# Patient Record
Sex: FEMALE | Race: BLACK OR AFRICAN AMERICAN | NOT HISPANIC OR LATINO | URBAN - METROPOLITAN AREA
[De-identification: names, ages, dates, MRNs, and addresses within clinical notes are randomized per-mention and may not be internally consistent; named-entity substitution may affect disease eponyms.]

---

## 2021-12-20 ENCOUNTER — INPATIENT (INPATIENT)
Facility: HOSPITAL | Age: 36
LOS: 0 days | Discharge: HOME | DRG: 305 | End: 2021-12-21
Attending: INTERNAL MEDICINE | Admitting: INTERNAL MEDICINE
Payer: COMMERCIAL

## 2021-12-20 VITALS
WEIGHT: 220.02 LBS | OXYGEN SATURATION: 98 % | HEIGHT: 62 IN | SYSTOLIC BLOOD PRESSURE: 166 MMHG | RESPIRATION RATE: 18 BRPM | DIASTOLIC BLOOD PRESSURE: 126 MMHG | TEMPERATURE: 98 F | HEART RATE: 83 BPM

## 2021-12-20 DIAGNOSIS — D64.9 ANEMIA, UNSPECIFIED: ICD-10-CM

## 2021-12-20 DIAGNOSIS — I16.0 HYPERTENSIVE URGENCY: ICD-10-CM

## 2021-12-20 DIAGNOSIS — J45.909 UNSPECIFIED ASTHMA, UNCOMPLICATED: ICD-10-CM

## 2021-12-20 LAB
ALBUMIN SERPL ELPH-MCNC: 4.9 G/DL — SIGNIFICANT CHANGE UP (ref 3.3–5)
ALP SERPL-CCNC: 86 U/L — SIGNIFICANT CHANGE UP (ref 40–120)
ALT FLD-CCNC: 19 U/L — SIGNIFICANT CHANGE UP (ref 10–45)
ANION GAP SERPL CALC-SCNC: 13 MMOL/L — SIGNIFICANT CHANGE UP (ref 5–17)
AST SERPL-CCNC: 20 U/L — SIGNIFICANT CHANGE UP (ref 10–40)
BILIRUB SERPL-MCNC: 0.3 MG/DL — SIGNIFICANT CHANGE UP (ref 0.2–1.2)
BUN SERPL-MCNC: 7 MG/DL — SIGNIFICANT CHANGE UP (ref 7–23)
CALCIUM SERPL-MCNC: 9.8 MG/DL — SIGNIFICANT CHANGE UP (ref 8.4–10.5)
CHLORIDE SERPL-SCNC: 102 MMOL/L — SIGNIFICANT CHANGE UP (ref 96–108)
CK MB CFR SERPL CALC: 1.8 NG/ML — SIGNIFICANT CHANGE UP (ref 0–6.7)
CK SERPL-CCNC: 149 U/L — SIGNIFICANT CHANGE UP (ref 25–170)
CO2 SERPL-SCNC: 23 MMOL/L — SIGNIFICANT CHANGE UP (ref 22–31)
CREAT SERPL-MCNC: 0.67 MG/DL — SIGNIFICANT CHANGE UP (ref 0.5–1.3)
D DIMER BLD IA.RAPID-MCNC: <150 NG/ML DDU — SIGNIFICANT CHANGE UP
GLUCOSE SERPL-MCNC: 84 MG/DL — SIGNIFICANT CHANGE UP (ref 70–99)
HCT VFR BLD CALC: 32.4 % — LOW (ref 34.5–45)
HGB BLD-MCNC: 8.8 G/DL — LOW (ref 11.5–15.5)
MCHC RBC-ENTMCNC: 19 PG — LOW (ref 27–34)
MCHC RBC-ENTMCNC: 27.2 GM/DL — LOW (ref 32–36)
MCV RBC AUTO: 70.1 FL — LOW (ref 80–100)
NRBC # BLD: 0 /100 WBCS — SIGNIFICANT CHANGE UP (ref 0–0)
PLATELET # BLD AUTO: 436 K/UL — HIGH (ref 150–400)
POTASSIUM SERPL-MCNC: 4.1 MMOL/L — SIGNIFICANT CHANGE UP (ref 3.5–5.3)
POTASSIUM SERPL-SCNC: 4.1 MMOL/L — SIGNIFICANT CHANGE UP (ref 3.5–5.3)
PROT SERPL-MCNC: 8.4 G/DL — HIGH (ref 6–8.3)
RBC # BLD: 4.62 M/UL — SIGNIFICANT CHANGE UP (ref 3.8–5.2)
RBC # FLD: SIGNIFICANT CHANGE UP (ref 10.3–14.5)
SARS-COV-2 RNA SPEC QL NAA+PROBE: NEGATIVE — SIGNIFICANT CHANGE UP
SODIUM SERPL-SCNC: 138 MMOL/L — SIGNIFICANT CHANGE UP (ref 135–145)
TROPONIN T SERPL-MCNC: 0.01 NG/ML — SIGNIFICANT CHANGE UP (ref 0–0.01)
TROPONIN T SERPL-MCNC: 0.01 NG/ML — SIGNIFICANT CHANGE UP (ref 0–0.01)
WBC # BLD: 6.82 K/UL — SIGNIFICANT CHANGE UP (ref 3.8–10.5)
WBC # FLD AUTO: 6.82 K/UL — SIGNIFICANT CHANGE UP (ref 3.8–10.5)

## 2021-12-20 PROCEDURE — 71046 X-RAY EXAM CHEST 2 VIEWS: CPT | Mod: 26

## 2021-12-20 PROCEDURE — 99284 EMERGENCY DEPT VISIT MOD MDM: CPT

## 2021-12-20 RX ORDER — ALBUTEROL 90 UG/1
2 AEROSOL, METERED ORAL EVERY 6 HOURS
Refills: 0 | Status: DISCONTINUED | OUTPATIENT
Start: 2021-12-20 | End: 2021-12-21

## 2021-12-20 RX ORDER — FERROUS SULFATE 325(65) MG
1 TABLET ORAL
Qty: 0 | Refills: 0 | DISCHARGE

## 2021-12-20 RX ORDER — LABETALOL HCL 100 MG
200 TABLET ORAL EVERY 12 HOURS
Refills: 0 | Status: DISCONTINUED | OUTPATIENT
Start: 2021-12-21 | End: 2021-12-21

## 2021-12-20 RX ORDER — ERGOCALCIFEROL 1.25 MG/1
1 CAPSULE ORAL
Qty: 0 | Refills: 0 | DISCHARGE

## 2021-12-20 RX ORDER — INFLUENZA VIRUS VACCINE 15; 15; 15; 15 UG/.5ML; UG/.5ML; UG/.5ML; UG/.5ML
0.5 SUSPENSION INTRAMUSCULAR ONCE
Refills: 0 | Status: DISCONTINUED | OUTPATIENT
Start: 2021-12-20 | End: 2021-12-21

## 2021-12-20 RX ORDER — LABETALOL HCL 100 MG
20 TABLET ORAL ONCE
Refills: 0 | Status: COMPLETED | OUTPATIENT
Start: 2021-12-20 | End: 2021-12-20

## 2021-12-20 RX ORDER — FERROUS SULFATE 325(65) MG
325 TABLET ORAL DAILY
Refills: 0 | Status: DISCONTINUED | OUTPATIENT
Start: 2021-12-20 | End: 2021-12-21

## 2021-12-20 RX ORDER — LABETALOL HCL 100 MG
10 TABLET ORAL ONCE
Refills: 0 | Status: COMPLETED | OUTPATIENT
Start: 2021-12-20 | End: 2021-12-20

## 2021-12-20 RX ORDER — ENOXAPARIN SODIUM 100 MG/ML
40 INJECTION SUBCUTANEOUS EVERY 12 HOURS
Refills: 0 | Status: DISCONTINUED | OUTPATIENT
Start: 2021-12-20 | End: 2021-12-21

## 2021-12-20 RX ORDER — LABETALOL HCL 100 MG
200 TABLET ORAL ONCE
Refills: 0 | Status: COMPLETED | OUTPATIENT
Start: 2021-12-20 | End: 2021-12-20

## 2021-12-20 RX ORDER — ALBUTEROL 90 UG/1
2 AEROSOL, METERED ORAL
Qty: 0 | Refills: 0 | DISCHARGE

## 2021-12-20 RX ADMIN — Medication 20 MILLIGRAM(S): at 14:16

## 2021-12-20 RX ADMIN — Medication 200 MILLIGRAM(S): at 23:14

## 2021-12-20 RX ADMIN — Medication 10 MILLIGRAM(S): at 17:46

## 2021-12-20 RX ADMIN — ENOXAPARIN SODIUM 40 MILLIGRAM(S): 100 INJECTION SUBCUTANEOUS at 22:12

## 2021-12-20 RX ADMIN — Medication 325 MILLIGRAM(S): at 22:12

## 2021-12-20 NOTE — ED PROVIDER NOTE - EKG ADDITIONAL INFORMATION FREE TEXT
EKG Reviewed  SR 82 no STEMI. No NILTON or STD. Nonspecific T wave flattening.   borderline Prolonged QTc 462

## 2021-12-20 NOTE — ED PROVIDER NOTE - OBJECTIVE STATEMENT
36 year old female with history of HTN and Iron deficiency anemia presenting from Cardiologist office for Hypertension. Patient was at her Cardiologist Dr. Cooper for a routine Stress test and was found to have Hypertension > 200 systolic and was sent to ED for further evaluation. patient states she has had intermittent right sided chest pinching pain for past year. Pain is intermittent, not worsened with exertion, described as pinching and pressure like. No associated shortness of breath. No nausea/vomiting, no radiation of pain. No hx of cardiac disease, no fam history of early cardiac death.  Had echo 11/29 she does not know results.   Takes metoprolol for BP. States does not tolerate calcium channel blockers as she has had headaches. Used to take labetalol but was taken off it for unknown reason per patient, states she tolerated labetalol well.   LMP 12/1, has heavy periods.

## 2021-12-20 NOTE — H&P ADULT - NSICDXPASTMEDICALHX_GEN_ALL_CORE_FT
PAST MEDICAL HISTORY:  H/O iron deficiency anemia     Hypertension      PAST MEDICAL HISTORY:  H/O iron deficiency anemia     Hypertension     Miscarriage 7/2021    Uterine fibroid

## 2021-12-20 NOTE — H&P ADULT - PROBLEM SELECTOR PLAN 1
Pt presented to Dr. Cooper's office for routine stress test for evaluation of intermittent chest pressure and L arm tingling x 1 year. SBP >200 and sent to ED. In ED BP as high as 229/145. Pt currently endorsing mild L sided chest pressure.   - Trop neg x 1, f/u next @ 8PM  - ECG: NSR @ 82bpm, upsloping ST in V2  - CXR nl   - S/p Labetalol 10mg IV x 3, BP currently 170/125  - Pt previously on Labetalol 200mg PO BID which was switched to Toprol XL 25mg PO QD with Olmesartan 20mg PO QD after she had an ECHO 11/2021 during which she had abnormal heart beats and was sent a 4 day holter monitor revealing abnormal heart beats as well.   - Pt had severe HA with Nifedipine, Felodipine and Amlodipine. Metoprolol causes exacerbation of her asthma (wheezing)  - Restart Labetalol 200mg PO BID in AM   - Repeat ECHO ordered   - Tele monitoring Acitretin Pregnancy And Lactation Text: This medication is Pregnancy Category X and should not be given to women who are pregnant or may become pregnant in the future. This medication is excreted in breast milk.

## 2021-12-20 NOTE — ED ADULT NURSE NOTE - CHIEF COMPLAINT QUOTE
37 [facility] of [facility], Pt was supposed to have stress test, however BP was elevated at the office. . Pt did not get her Metoprolol today.

## 2021-12-20 NOTE — PATIENT PROFILE ADULT - FALL HARM RISK - HARM RISK INTERVENTIONS

## 2021-12-20 NOTE — ED ADULT NURSE NOTE - OBJECTIVE STATEMENT
pt. a&ox4 ambulatory snet to ED by cardiologist after high bp readings of sbp >200. Pt. was scheduled for a stress test today. pt. has hx of htn, placed on 4 different medications for BP in the last month by cardiologist. Pt. is currently on metoprolol and another bp med pt. does not recall the name of. Pt. reports an intermittent squeezing of the right side of her chest since yesterday, with occasional tingling down the left arm. Pt. denies cp currently, denies radiation of the squeezing sensation, denies headache, changes in vision, dizziness, n/v/d, fever, chills, leg swelling. Pt. reports she has been getting more SOB upon exertion. Pt. also dxed with anemia a few months ago and has been taking PO iron supplements for it and was referred to a hematologist. Pt. had miscarriage in July, and since then pt. reports her bp has been high despite medication compliance. Pt. airway patent, speaking in clear coherent sentences, breathing spontaneous and unlabored.

## 2021-12-20 NOTE — ED ADULT NURSE NOTE - NSIMPLEMENTINTERV_GEN_ALL_ED
Implemented All Universal Safety Interventions:  [place] to call system. Call bell, personal items and telephone within reach. Instruct patient to call for assistance. Room bathroom lighting operational. Non-slip footwear when patient is off stretcher. Physically safe environment: no spills, clutter or unnecessary equipment. Stretcher in lowest position, wheels locked, appropriate side rails in place.

## 2021-12-20 NOTE — ED PROVIDER NOTE - CROS ED GI ALL NEG
[address] [address]  Phone - [phone]      Chief Complaint:     Mirella Flores is a 28 year old female who is here for:    1. Drug screening, pre-employment      Needs urine drug screen  States she took home drug test and was positive for marijuana  Had urine drug scree here about 1 month ago - Negative  Does not do any marijuana  Around people who do it    History:     Past Medical History:   Diagnosis Date   • Anemia    • Breast disorder     fibrocystic changes v fibroadenoma   • Bronchitis    • Depression 2009   • Depressive disorder, not elsewhere classified 2/15/2012   • H. pylori infection 02/18/2015, 7/2019   • NO HX OF     HTN, DM, CVA, DVT/PE, MI, Cancer   • Personal history of traumatic fracture     wrist   • Urinary tract infection 01/2019       Social History     Socioeconomic History   • Marital status: Single     Spouse name: Blake   • Number of children: Not on file   • Years of education: Not on file   • Highest education level: Not on file   Occupational History     Comment: MA training   Social Needs   • Financial resource strain: Not hard at all   • Food insecurity:     Worry: Never true     Inability: Never true   • Transportation needs:     Medical: No     Non-medical: No   Tobacco Use   • Smoking status: Never Smoker   • Smokeless tobacco: Never Used   Substance and Sexual Activity   • Alcohol use: No     Frequency: Never     Drinks per session: 1 or 2     Binge frequency: Never   • Drug use: No   • Sexual activity: Yes     Partners: Male     Birth control/protection: None     Comment: Monogamous x11 years - FOB   Lifestyle   • Physical activity:     Days per week: Not on file     Minutes per session: Not on file   • Stress: Not on file   Relationships   • Social connections:     Talks on phone: Not on file     Gets together: Not on file     Attends Ca[employer]ism service: Not on file     Active member of club or organization:  Not on file     Attends meetings of clubs or organizations: Not on file     Relationship status: Not on file   • Intimate partner violence:     Fear of current or ex partner: No     Emotionally abused: No     Physically abused: No     Forced sexual activity: No   Other Topics Concern   • Not on file   Social History Narrative    Lives at home with SO and 3yr old. Feels safe in environment       Family History   Problem Relation Age of Onset   • Hypertension Mother    • Cancer, Breast Mother 39   • Asthma Father    • Diabetes Paternal Grandmother    • Cancer Maternal Uncle 40        lung cancer, is a smoker   • Other Neg Hx         DVT/PE, Bleeding disorder, CVA, Early MI, Other cancer         Review of Systems:     Negative except as noted above.     Physical Examination:     Vital Signs:   Visit Vitals  BP 98/60   Pulse 76   Wt 55.6 kg   LMP 05/01/2019   BMI 21.71 kg/m²     General: Well-developed, well-nourished female , in no acute distress. Pt is alert and oriented x 3.     Assessment and Plan:   1. Drug screening, pre-employment  - DRUG SCREEN COMPLETE, URINE; Future  - can print the result from [employer] chart or can  the copy of result      Follow up as needed     Instructions provided as documented in the after visit summary.    The patient indicated understanding of the diagnosis and agreed with the plan of care.    Medication side effects, risk and benefits discussed. Patient is aware of that and is willing to take the medication.    Patient instructions. Advised the patient if symptoms get worse, develops any new symptoms, concerns, questions, problems, to give me a call or come in. If it is after hours the patient should call the on-call physician or go to the emergency room. The patient knows when to call us and when to go to the ER. Follow up with me after above testing. All plans discussed with the patient at length. The patient understands and agrees.    Any time labs are done,  please remember  to check your lab results via [employer] 48 hrs after you get labs done or  to call our office if you have not gotten notice of your lab results within 2 weeks, thanks.    Continue medicines per Epic as applicable.    Please see After Visit Summary for other details of the remaining discussion.     Hector Salguero MD  Family Practice       negative...

## 2021-12-20 NOTE — H&P ADULT - ASSESSMENT
35 yo F with PMHx of HTN, Uterine fibroids, JOSE, Asthma (no recent hospitalizations, no intubations) who presented to the [facility] ED sent by her cardiologist Dr. Cooper for evaluation of elevated blood pressure. Pt initially presented to his office for symptoms of intermittent chest pressure and was scheduled for a routine stress test. Her BP was found to be >200 and she was sent to the ED. BP was as high as 229/145 which improved with IV Labetalol Pt is now admitted to cardiology/telemetry for further management of hypertensive urgency.

## 2021-12-20 NOTE — H&P ADULT - NSHPLABSRESULTS_GEN_ALL_CORE
8.8    6.82  )-----------( 436      ( 20 Dec 2021 15:57 )             32.4       12-20    138  |  102  |  7   ----------------------------<  84  4.1   |  23  |  0.67    Ca    9.8      20 Dec 2021 14:25    TPro  8.4<H>  /  Alb  4.9  /  TBili  0.3  /  DBili  x   /  AST  20  /  ALT  19  /  AlkPhos  86  12-20          CARDIAC MARKERS ( 20 Dec 2021 14:25 )  x     / 0.01 ng/mL / x     / x     / x

## 2021-12-20 NOTE — ED PROVIDER NOTE - PHYSICAL EXAMINATION
CONSTITUTIONAL: Well-developed; well-nourished; in no acute distress.   HEAD:  normocephalic, atraumatic.  EYES: EOM intact; PERRL. conjunctiva and sclera clear.  ENT: Airway clear. Moist mucus membranes  NECK: Supple; non tender. No JVD.   CARD: S1, S2 normal; no murmurs, gallops, or rubs. Regular rate and rhythm. No chest wall tenderness.    RESP:  Clear to auscultation b/l, no wheezes, rales or rhonchi.  ABD: Normal bowel sounds; soft; non-distended; non-tender; no guarding/ rebound/rigidity.   EXT: Normal ROM. No clubbing, cyanosis or edema. 2+ pulses to b/l ue/le. Equal pulses all 4 extremities.   NEURO: Alert, oriented, no focal neuro deficits   SKIN:  warm and dry, no acute rash.

## 2021-12-20 NOTE — ED PROVIDER NOTE - PROGRESS NOTE DETAILS
SPoke to Dr. Cooper Cardiology who requests admission for hypertensive urgency.   States he has outpatient Loop recorder with episodes of NSVT which is why he has her on Metoprolol. Spoke to Dr. Cooper Cardiology who requests admission for hypertensive urgency.   States he has outpatient Loop recorder with episodes of NSVT which is why he has her on Metoprolol.

## 2021-12-20 NOTE — H&P ADULT - PROBLEM SELECTOR PLAN 3
H/o mild asthma, not currently in exacerbation  - On Albuterol at home, ordered Duonebs PRN          DVT PPX: Lovenox SQ  Dispo: Pending clinical progression

## 2021-12-20 NOTE — H&P ADULT - PROBLEM SELECTOR PLAN 2
Hgb/HCT 8.8/32.4. Per pt Hgb was 7.8 3 weeks ago.   - H/o uterine fibroids and a miscarriage in 7/2021, since then she has had worsening of menstrual bleeding. Follows with GYN  - On Iron at home, continue  - F/u iron studies  - Maintain active T&S

## 2021-12-20 NOTE — ED PROVIDER NOTE - CLINICAL SUMMARY MEDICAL DECISION MAKING FREE TEXT BOX
Patient with hypertensive urgency plan to decrease SBP by 20%.   Lab work, EKG, cardiac monitor.   Will discuss case with patient's Cardiologist Dr. Cooper.

## 2021-12-20 NOTE — ED ADULT TRIAGE NOTE - CHIEF COMPLAINT QUOTE
37 [facility] of [facility], Pt was supposed to have stress test, however BP was elevated at the office. . Pt did not get her Metoprolol today.

## 2021-12-20 NOTE — H&P ADULT - HISTORY OF PRESENT ILLNESS
INCOMPLETE    37 yo F with PMHx of HTN and JOSE who presented to the [facility] ED sent by her cardiologist Dr. Cooper for evaluation of elevated blood pressure. Pt initially presented to his office for a stress test and her BP was found to be >200 and she was sent to the ED. Pt reports she has has intermittent R sided pinching CP at rest, not associated with exertion x 1 year. Pt had an ECHO done on 11/19/21, does not recall the results. Pt denies fever, chills, cough, palpitations, SOB, PND/orthopnea, LE edema, abdominal pain, N/V/D, dizziness, syncope.   In the ED VS: F 98.1F, /126 (has high as 229/145), HR 83bpm, RR 18, O2 sat 100% on RA. Labs significant for Hgb/HCT 8.8/32.4, Trop neg, COVID PCR neg. ECG: NSR @ 82bpm, upsloping ST in V2. CXR: no acute abnormalities. Pt was given Labetalol 20mg IV x 1 with improvement in BP to 145/85 which increased back up to 195/109 and an additional Labetalol 10mg IV x 1 given. Pt is now admitted to cardiology/telemetry for further management of hypertensive urgency.  35 yo F with PMHx of HTN, Uterine fibroids, JOSE, Asthma (no recent hospitalizations, no intubations) who presented to the [facility] ED sent by her cardiologist Dr. Cooper for evaluation of elevated blood pressure. Pt initially presented to his office for a routine stress test and her BP was found to be >200 and she was sent to the ED. Pt reports she has had intermittent R sided pinching CP at rest with L arm tingling, not associated with exertion x 1 year. Pt had an ECHO done on 11/19/21, states that there was remodeling as well as abnormal heart beats noted during the study so she was sent a 4 day holtor monitor which also revealed abnormal heart beats. Her home Labetalol was then d/c and she was started on Toprol XL and Olmesartan. She is concerned that the Metoprolol is exacerbating her asthma and causing her to wheeze. Pt denies fever, chills, cough, palpitations, SOB, PND/orthopnea, LE edema, abdominal pain, N/V/D, dizziness, syncope, HA, blurred vision. In the ED VS: F 98.1F, /126 (has high as 229/145), HR 83bpm, RR 18, O2 sat 100% on RA. Labs significant for Hgb/HCT 8.8/32.4, Trop neg, COVID PCR neg. ECG: NSR @ 82bpm, upsloping ST in V2. CXR: no acute abnormalities. Pt was given Labetalol 10mg IV x 2 with improvement in BP to 145/85 which increased back up to 195/109 and an additional Labetalol 10mg IV x 1 given. Pt is now admitted to cardiology/telemetry for further management of hypertensive urgency.

## 2021-12-21 VITALS — TEMPERATURE: 98 F

## 2021-12-21 LAB
A1C WITH ESTIMATED AVERAGE GLUCOSE RESULT: 4.6 % — SIGNIFICANT CHANGE UP (ref 4–5.6)
ANION GAP SERPL CALC-SCNC: 15 MMOL/L — SIGNIFICANT CHANGE UP (ref 5–17)
ANISOCYTOSIS BLD QL: SIGNIFICANT CHANGE UP
BASOPHILS # BLD AUTO: 0.11 K/UL — SIGNIFICANT CHANGE UP (ref 0–0.2)
BASOPHILS NFR BLD AUTO: 1.7 % — SIGNIFICANT CHANGE UP (ref 0–2)
BLD GP AB SCN SERPL QL: NEGATIVE — SIGNIFICANT CHANGE UP
BUN SERPL-MCNC: 9 MG/DL — SIGNIFICANT CHANGE UP (ref 7–23)
CALCIUM SERPL-MCNC: 9.2 MG/DL — SIGNIFICANT CHANGE UP (ref 8.4–10.5)
CHLORIDE SERPL-SCNC: 102 MMOL/L — SIGNIFICANT CHANGE UP (ref 96–108)
CHOLEST SERPL-MCNC: 227 MG/DL — HIGH
CO2 SERPL-SCNC: 21 MMOL/L — LOW (ref 22–31)
CREAT SERPL-MCNC: 0.77 MG/DL — SIGNIFICANT CHANGE UP (ref 0.5–1.3)
DACRYOCYTES BLD QL SMEAR: SLIGHT — SIGNIFICANT CHANGE UP
ELLIPTOCYTES BLD QL SMEAR: SLIGHT — SIGNIFICANT CHANGE UP
EOSINOPHIL # BLD AUTO: 0.17 K/UL — SIGNIFICANT CHANGE UP (ref 0–0.5)
EOSINOPHIL NFR BLD AUTO: 2.6 % — SIGNIFICANT CHANGE UP (ref 0–6)
ESTIMATED AVERAGE GLUCOSE: 85 MG/DL — SIGNIFICANT CHANGE UP (ref 68–114)
FERRITIN SERPL-MCNC: 46 NG/ML — SIGNIFICANT CHANGE UP (ref 15–150)
FOLATE SERPL-MCNC: 8.8 NG/ML — SIGNIFICANT CHANGE UP
GIANT PLATELETS BLD QL SMEAR: PRESENT — SIGNIFICANT CHANGE UP
GLUCOSE SERPL-MCNC: 78 MG/DL — SIGNIFICANT CHANGE UP (ref 70–99)
HCG UR QL: NEGATIVE — SIGNIFICANT CHANGE UP
HCT VFR BLD CALC: 31.8 % — LOW (ref 34.5–45)
HDLC SERPL-MCNC: 65 MG/DL — SIGNIFICANT CHANGE UP
HGB BLD-MCNC: 8.4 G/DL — LOW (ref 11.5–15.5)
HYPOCHROMIA BLD QL: SLIGHT — SIGNIFICANT CHANGE UP
IRON SATN MFR SERPL: 121 UG/DL — SIGNIFICANT CHANGE UP (ref 30–160)
IRON SATN MFR SERPL: 38 % — SIGNIFICANT CHANGE UP (ref 14–50)
LIPID PNL WITH DIRECT LDL SERPL: 147 MG/DL — HIGH
LYMPHOCYTES # BLD AUTO: 3.12 K/UL — SIGNIFICANT CHANGE UP (ref 1–3.3)
LYMPHOCYTES # BLD AUTO: 47.4 % — HIGH (ref 13–44)
MAGNESIUM SERPL-MCNC: 2.1 MG/DL — SIGNIFICANT CHANGE UP (ref 1.6–2.6)
MANUAL SMEAR VERIFICATION: SIGNIFICANT CHANGE UP
MCHC RBC-ENTMCNC: 19.1 PG — LOW (ref 27–34)
MCHC RBC-ENTMCNC: 26.4 GM/DL — LOW (ref 32–36)
MCV RBC AUTO: 72.3 FL — LOW (ref 80–100)
MICROCYTES BLD QL: SIGNIFICANT CHANGE UP
MONOCYTES # BLD AUTO: 0.75 K/UL — SIGNIFICANT CHANGE UP (ref 0–0.9)
MONOCYTES NFR BLD AUTO: 11.4 % — SIGNIFICANT CHANGE UP (ref 2–14)
NEUTROPHILS # BLD AUTO: 2.37 K/UL — SIGNIFICANT CHANGE UP (ref 1.8–7.4)
NEUTROPHILS NFR BLD AUTO: 36 % — LOW (ref 43–77)
NON HDL CHOLESTEROL: 162 MG/DL — HIGH
OVALOCYTES BLD QL SMEAR: SLIGHT — SIGNIFICANT CHANGE UP
PLAT MORPH BLD: ABNORMAL
PLATELET # BLD AUTO: 444 K/UL — HIGH (ref 150–400)
POIKILOCYTOSIS BLD QL AUTO: SLIGHT — SIGNIFICANT CHANGE UP
POLYCHROMASIA BLD QL SMEAR: SLIGHT — SIGNIFICANT CHANGE UP
POTASSIUM SERPL-MCNC: 3.8 MMOL/L — SIGNIFICANT CHANGE UP (ref 3.5–5.3)
POTASSIUM SERPL-SCNC: 3.8 MMOL/L — SIGNIFICANT CHANGE UP (ref 3.5–5.3)
RBC # BLD: 4.41 M/UL — SIGNIFICANT CHANGE UP (ref 3.8–5.2)
RBC # BLD: 4.41 M/UL — SIGNIFICANT CHANGE UP (ref 3.8–5.2)
RBC # FLD: 33.7 % — HIGH (ref 10.3–14.5)
RBC BLD AUTO: ABNORMAL
RETICS #: 71.9 K/UL — SIGNIFICANT CHANGE UP (ref 25–125)
RETICS/RBC NFR: 1.6 % — SIGNIFICANT CHANGE UP (ref 0.5–2.5)
RH IG SCN BLD-IMP: NEGATIVE — SIGNIFICANT CHANGE UP
SCHISTOCYTES BLD QL AUTO: SLIGHT — SIGNIFICANT CHANGE UP
SODIUM SERPL-SCNC: 138 MMOL/L — SIGNIFICANT CHANGE UP (ref 135–145)
TARGETS BLD QL SMEAR: SLIGHT — SIGNIFICANT CHANGE UP
TIBC SERPL-MCNC: 319 UG/DL — SIGNIFICANT CHANGE UP (ref 220–430)
TRANSFERRIN SERPL-MCNC: 283 MG/DL — SIGNIFICANT CHANGE UP (ref 200–360)
TRIGL SERPL-MCNC: 73 MG/DL — SIGNIFICANT CHANGE UP
TSH SERPL-MCNC: 1.73 UIU/ML — SIGNIFICANT CHANGE UP (ref 0.27–4.2)
UIBC SERPL-MCNC: 198 UG/DL — SIGNIFICANT CHANGE UP (ref 110–370)
VARIANT LYMPHS # BLD: 0.9 % — SIGNIFICANT CHANGE UP (ref 0–6)
VIT B12 SERPL-MCNC: 560 PG/ML — SIGNIFICANT CHANGE UP (ref 232–1245)
WBC # BLD: 6.59 K/UL — SIGNIFICANT CHANGE UP (ref 3.8–10.5)
WBC # FLD AUTO: 6.59 K/UL — SIGNIFICANT CHANGE UP (ref 3.8–10.5)

## 2021-12-21 PROCEDURE — 85379 FIBRIN DEGRADATION QUANT: CPT

## 2021-12-21 PROCEDURE — 81025 URINE PREGNANCY TEST: CPT

## 2021-12-21 PROCEDURE — 82746 ASSAY OF FOLIC ACID SERUM: CPT

## 2021-12-21 PROCEDURE — 87635 SARS-COV-2 COVID-19 AMP PRB: CPT

## 2021-12-21 PROCEDURE — 96375 TX/PRO/DX INJ NEW DRUG ADDON: CPT

## 2021-12-21 PROCEDURE — 71046 X-RAY EXAM CHEST 2 VIEWS: CPT

## 2021-12-21 PROCEDURE — 75574 CT ANGIO HRT W/3D IMAGE: CPT

## 2021-12-21 PROCEDURE — 85027 COMPLETE CBC AUTOMATED: CPT

## 2021-12-21 PROCEDURE — 36415 COLL VENOUS BLD VENIPUNCTURE: CPT

## 2021-12-21 PROCEDURE — 96374 THER/PROPH/DIAG INJ IV PUSH: CPT

## 2021-12-21 PROCEDURE — 83735 ASSAY OF MAGNESIUM: CPT

## 2021-12-21 PROCEDURE — 93005 ELECTROCARDIOGRAM TRACING: CPT

## 2021-12-21 PROCEDURE — 82607 VITAMIN B-12: CPT

## 2021-12-21 PROCEDURE — 80061 LIPID PANEL: CPT

## 2021-12-21 PROCEDURE — 83540 ASSAY OF IRON: CPT

## 2021-12-21 PROCEDURE — 93306 TTE W/DOPPLER COMPLETE: CPT

## 2021-12-21 PROCEDURE — 86901 BLOOD TYPING SEROLOGIC RH(D): CPT

## 2021-12-21 PROCEDURE — 83550 IRON BINDING TEST: CPT

## 2021-12-21 PROCEDURE — 86900 BLOOD TYPING SEROLOGIC ABO: CPT

## 2021-12-21 PROCEDURE — 99285 EMERGENCY DEPT VISIT HI MDM: CPT | Mod: 25

## 2021-12-21 PROCEDURE — 85025 COMPLETE CBC W/AUTO DIFF WBC: CPT

## 2021-12-21 PROCEDURE — 82728 ASSAY OF FERRITIN: CPT

## 2021-12-21 PROCEDURE — 85045 AUTOMATED RETICULOCYTE COUNT: CPT

## 2021-12-21 PROCEDURE — 84443 ASSAY THYROID STIM HORMONE: CPT

## 2021-12-21 PROCEDURE — 80053 COMPREHEN METABOLIC PANEL: CPT

## 2021-12-21 PROCEDURE — 82550 ASSAY OF CK (CPK): CPT

## 2021-12-21 PROCEDURE — 86850 RBC ANTIBODY SCREEN: CPT

## 2021-12-21 PROCEDURE — 84466 ASSAY OF TRANSFERRIN: CPT

## 2021-12-21 PROCEDURE — 75574 CT ANGIO HRT W/3D IMAGE: CPT | Mod: 26

## 2021-12-21 PROCEDURE — 82553 CREATINE MB FRACTION: CPT

## 2021-12-21 PROCEDURE — 84484 ASSAY OF TROPONIN QUANT: CPT

## 2021-12-21 PROCEDURE — 83036 HEMOGLOBIN GLYCOSYLATED A1C: CPT

## 2021-12-21 PROCEDURE — 93306 TTE W/DOPPLER COMPLETE: CPT | Mod: 26

## 2021-12-21 PROCEDURE — 80048 BASIC METABOLIC PNL TOTAL CA: CPT

## 2021-12-21 RX ORDER — METOPROLOL TARTRATE 50 MG
1 TABLET ORAL
Qty: 0 | Refills: 0 | DISCHARGE

## 2021-12-21 RX ORDER — METOPROLOL TARTRATE 50 MG
50 TABLET ORAL ONCE
Refills: 0 | Status: COMPLETED | OUTPATIENT
Start: 2021-12-21 | End: 2021-12-21

## 2021-12-21 RX ORDER — ACETAMINOPHEN 500 MG
650 TABLET ORAL EVERY 6 HOURS
Refills: 0 | Status: DISCONTINUED | OUTPATIENT
Start: 2021-12-21 | End: 2021-12-21

## 2021-12-21 RX ORDER — LABETALOL HCL 100 MG
300 TABLET ORAL
Refills: 0 | Status: DISCONTINUED | OUTPATIENT
Start: 2021-12-21 | End: 2021-12-21

## 2021-12-21 RX ORDER — LABETALOL HCL 100 MG
1 TABLET ORAL
Qty: 60 | Refills: 0
Start: 2021-12-21 | End: 2022-01-19

## 2021-12-21 RX ORDER — OLMESARTAN MEDOXOMIL 5 MG/1
1 TABLET, FILM COATED ORAL
Qty: 0 | Refills: 0 | DISCHARGE

## 2021-12-21 RX ADMIN — Medication 200 MILLIGRAM(S): at 09:02

## 2021-12-21 RX ADMIN — Medication 650 MILLIGRAM(S): at 06:40

## 2021-12-21 RX ADMIN — ENOXAPARIN SODIUM 40 MILLIGRAM(S): 100 INJECTION SUBCUTANEOUS at 09:02

## 2021-12-21 RX ADMIN — Medication 650 MILLIGRAM(S): at 07:15

## 2021-12-21 RX ADMIN — Medication 50 MILLIGRAM(S): at 12:53

## 2021-12-21 RX ADMIN — Medication 325 MILLIGRAM(S): at 09:02

## 2021-12-21 NOTE — DISCHARGE NOTE PROVIDER - PROVIDER TOKENS
FREE:[LAST:[Allison],FIRST:[Cadence],PHONE:[phone]],FAX:[(   )    -],ADDRESS:[[address] [place] [address]],FOLLOWUP:[2 weeks],ESTABLISHEDPATIENT:[T]]

## 2021-12-21 NOTE — DISCHARGE NOTE PROVIDER - HOSPITAL COURSE
35 yo F with PMHx of HTN, Uterine fibroids, JOSE, Asthma (no recent hospitalizations, no intubations) who presented to the [facility] ED sent by her cardiologist Dr. Cadence Cooper for evaluation of elevated blood pressure. Pt initially presented to his office for a routine stress test and her BP was found to be >200 and she was sent to the ED. Pt reports she has had intermittent L sided pinching CP at rest with L arm tingling, not associated with exertion x 1 year. Pt had an ECHO done on 11/19/21, states that there was remodeling as well as abnormal heart beats noted during the study so she was sent a 4 day holter monitor which also revealed abnormal heart beats. Her home Labetalol was then d/c and she was started on Toprol XL and Olmesartan. She reports that Metoprolol is exacerbating her asthma and is causing her to wheeze at night. In the ED: /126 (has high as 229/145), HR 83bpm, RR 18, O2 sat 100% on RA. Labs significant for Hgb/HCT 8.8/32.4, Trop neg x 2, COVID PCR neg. ECG: NSR @ 82bpm, upsloping ST in V2. CXR: no acute abnormalities. Pt was given Labetalol 10mg IV x 2 with improvement in BP to 145/85 which increased back up to 195/109 and an additional Labetalol 10mg IV x 1 given. Pt was admitted to cardiac telemetry for hypertensive urgency.  Pt was resumed on previous dose of Labetalol 200mg BID w/ /90s. Labetalol was further uptitrated to 300mg BID at discharge for BP control. ECHO unremarkable, revealed EF 60-65%, normal LV/RV size and systolic function, no significant valvular disease. CCTA revealed calcium score zero.  Bridge of mid to distal LAD with minimal systolic narrowing.  Probably normal RPDA, Remaining coronary segments appear normal, Left ventricular concentric hypertrophy noted.     On the day of discharge, the patient was seen and examined. Symptoms improved. Vital signs are stable. Labs and imaging reviewed. Patient is medically optimized and hemodynamically stable. Return precautions discussed, medication teach back done, and importance of physician followup emphasized. The patient verbalized understanding. Attempted to call outpatient cardiologist Dr Cooper for collateral and updates, left message to call back as was unable to get thru office.

## 2021-12-21 NOTE — DIETITIAN INITIAL EVALUATION ADULT. - ENTER FROM (CAL/KG)
[No Acute Distress] : no acute distress [Normal Oropharynx] : normal oropharynx [Normal Appearance] : normal appearance [No Neck Mass] : no neck mass [Normal Rate/Rhythm] : normal rate/rhythm [Normal S1, S2] : normal s1, s2 [No Murmurs] : no murmurs [No Resp Distress] : no resp distress [Clear to Auscultation Bilaterally] : clear to auscultation bilaterally [No Abnormalities] : no abnormalities [Benign] : benign [Normal Gait] : normal gait [No Clubbing] : no clubbing [No Cyanosis] : no cyanosis [No Edema] : no edema [FROM] : FROM [Normal Color/ Pigmentation] : normal color/ pigmentation [No Focal Deficits] : no focal deficits [Oriented x3] : oriented x3 [Normal Affect] : normal affect 25

## 2021-12-21 NOTE — DISCHARGE NOTE PROVIDER - NSDCCPCAREPLAN_GEN_ALL_CORE_FT
PRINCIPAL DISCHARGE DIAGNOSIS  Diagnosis: Hypertensive urgency  Assessment and Plan of Treatment: You came into the hospital for very high blood pressures 229/145. You were given intravenous Labetalol and was resumed on oral Labetalol in the hospital to lower your blood pressure. Your dose of Labetalol was INCREASED to 300mg twice a day to help control high blood pressure. Your recent medications Olmesartan and Toprol was stopped. You had an echocardiogram that showed your heart pumping function is normal (Ejection Fraction 65%). It is very important to make sure your blood pressure is controlled at a normal range because uncontrolled high blood pressure can lead to stroke, heart disease, heart failure, kidney failure, damage to your blood vessels (arteries) causing aneurysms. Please follow up with your Cardiologist and PMD in 1-2 weeks.      SECONDARY DISCHARGE DIAGNOSES  Diagnosis: Chest pain  Assessment and Plan of Treatment: You have a history of chest pain over the last year. You had a CT Scan of the Heart which revealed no blockages to the coronary arteries.  Follow up with your cardiologist Dr Cooper in 1-2 weeks.

## 2021-12-21 NOTE — DIETITIAN INITIAL EVALUATION ADULT. - OTHER CALCULATIONS
%UDZ=050; IBW used to calculate energy needs due to pt's current body weight exceeding 120% of IBW; adjusted for age and BMI

## 2021-12-21 NOTE — DIETITIAN INITIAL EVALUATION ADULT. - PROBLEM SELECTOR PLAN 1
Pt presented to Dr. Cooper's office for routine stress test for evaluation of intermittent chest pressure and L arm tingling x 1 year. SBP >200 and sent to ED. In ED BP as high as 229/145. Pt currently endorsing mild L sided chest pressure.   - Trop neg x 1, f/u next @ 8PM  - ECG: NSR @ 82bpm, upsloping ST in V2  - CXR nl   - S/p Labetalol 10mg IV x 3, BP currently 170/125  - Pt previously on Labetalol 200mg PO BID which was switched to Toprol XL 25mg PO QD with Olmesartan 20mg PO QD after she had an ECHO 11/2021 during which she had abnormal heart beats and was sent a 4 day holter monitor revealing abnormal heart beats as well.   - Pt had severe HA with Nifedipine, Felodipine and Amlodipine. Metoprolol causes exacerbation of her asthma (wheezing)  - Restart Labetalol 200mg PO BID in AM   - Repeat ECHO ordered   - Tele monitoring

## 2021-12-21 NOTE — DISCHARGE NOTE NURSING/CASE MANAGEMENT/SOCIAL WORK - PATIENT PORTAL LINK FT
You can access the FollowMyHealth Patient Portal offered by [facility] by registering at the following website: [email or web address]. By joining [facility]’s FollowMyHealth portal, you will also be able to view your health information using other applications (apps) compatible with our system.

## 2021-12-21 NOTE — DIETITIAN INITIAL EVALUATION ADULT. - OTHER INFO
37 yo F with PMHx of HTN, Uterine fibroids, JOSE, Asthma (no recent hospitalizations, no intubations) who presented to the [facility] ED sent by her cardiologist Dr. Cooper for evaluation of elevated blood pressure. Pt initially presented to his office for symptoms of intermittent chest pressure and was scheduled for a routine stress test. Her BP was found to be >200 and she was sent to the ED. BP was as high as 229/145 which improved with IV Labetalol, now admitted to cardiology/telemetry for further management of hypertensive urgency.       Pt seen alert in room. /105 this AM. Pt reports following diet less strictly since the summer, resulted in wt gain. Has been eating more red meat. PO will vary d/t work, often skips meals. Allergic to almonds, kiwi, apples, lobster, cherries, peaches and pears. Denies NVDC. +1BL ankle edema. No pain. Adams 21.  Please see below for nutritions recommendations. Paged Team.

## 2021-12-21 NOTE — DIETITIAN INITIAL EVALUATION ADULT. - PERSON TAUGHT/METHOD
DASH diet education./verbal instruction/patient instructed/teach back - (Patient repeats in own words)

## 2021-12-21 NOTE — DISCHARGE NOTE PROVIDER - NSDCMRMEDTOKEN_GEN_ALL_CORE_FT
Albuterol (Eqv-ProAir HFA) 90 mcg/inh inhalation aerosol: 2 puff(s) inhaled every 6 hours, As Needed  ferrous sulfate 75 mg (15 mg elemental iron) oral tablet: 1 tab(s) orally once a day  labetalol 300 mg oral tablet: 1 tab(s) orally 2 times a day  Vitamin D2 1.25 mg (50,000 intl units) oral capsule: 1 cap(s) orally once a week

## 2021-12-21 NOTE — DISCHARGE NOTE NURSING/CASE MANAGEMENT/SOCIAL WORK - NSDCPEFALRISK_GEN_ALL_CORE
For information on Fall & Injury Prevention, visit: [email or web address] OR  [email or web address] OR  https://www.cdc.gov/steadi/patient.html

## 2021-12-28 DIAGNOSIS — J45.909 UNSPECIFIED ASTHMA, UNCOMPLICATED: ICD-10-CM

## 2021-12-28 DIAGNOSIS — E66.01 MORBID (SEVERE) OBESITY DUE TO EXCESS CALORIES: ICD-10-CM

## 2021-12-28 DIAGNOSIS — I16.0 HYPERTENSIVE URGENCY: ICD-10-CM

## 2021-12-28 DIAGNOSIS — I42.2 OTHER HYPERTROPHIC CARDIOMYOPATHY: ICD-10-CM

## 2021-12-28 DIAGNOSIS — I10 ESSENTIAL (PRIMARY) HYPERTENSION: ICD-10-CM

## 2021-12-28 DIAGNOSIS — D50.9 IRON DEFICIENCY ANEMIA, UNSPECIFIED: ICD-10-CM

## 2021-12-28 DIAGNOSIS — D25.9 LEIOMYOMA OF UTERUS, UNSPECIFIED: ICD-10-CM

## 2021-12-28 DIAGNOSIS — Z82.49 FAMILY HISTORY OF ISCHEMIC HEART DISEASE AND OTHER DISEASES OF THE CIRCULATORY SYSTEM: ICD-10-CM

## 2022-05-23 NOTE — H&P ADULT - PROBLEM SELECTOR PROBLEM 3
87 yo F presents to ED A+Ox3 accompanied by family member via wheelchair c/o chest pain. Patient reports pain since last night that "goes across the chest" and radiates to her abdomen and back. Denies fever, chills, shortness of breath, N/V. Breathing spontaneous and unlabored on room air. Skin warm pink and dry. Sinus tachycardia on cardiac monitor. Comfort and safety measures in place. Family member at bedside. Asthma

## 2025-02-04 NOTE — ED ADULT NURSE NOTE - EXTENSIONS OF SELF_ADULT
Follow up in April as scheduled    Continue current medications and therapy for chronic medical conditions.    Patient was advised importance of proper diet/nutrition in addition adequate hydration. Patient was encouraged moderate exercise program to include 30 minutes daily for 5 days of the week or 150 minutes weekly. Patient will follow-up with us as scheduled.    I personally reviewed the OARRS report for this patient. I have considered the risks of abuse, dependence, addiction, and diversion.    UDS/CSA:    Start Jardiance 10 mgs daily    Refer to allergy    Review echocardiogram from 2023    Refer to cardiology/RVH         None

## 2025-02-18 NOTE — DISCHARGE NOTE PROVIDER - CARE PROVIDER_API CALL
ACUTE PHYSICAL THERAPY GOALS:   (Developed with and agreed upon by patient and/or caregiver.)  LTG:  (1.)Ms. Stuart will move from supine to sit and sit to supine, scoot up and down and roll side to side in bed with INDEPENDENT within 7 day(s).   (2.)Ms. Stuart will transfer from bed to chair and chair to bed with INDEPENDENT using the least restrictive device within 7 day(s).   (3.)Ms. Stuart will ambulate with modified INDEPENDENCE for 500+ feet with the least restrictive/no device within 7 day(s).  (4.)Ms. Stuart will perform standing static and dynamic balance activities x 8 minutes with SUPERVISION to improve safety within 7 day(s).   (5.)Ms. Stuart will ascend and descend 4 stairs using one hand rail(s) with SUPERVISION to improve functional mobility and safety within 7 day(s).    PHYSICAL THERAPY: Daily Note PM   (Link to Caseload Tracking: PT Visit Days : 3  Time In/Out PT Charge Capture  Rehab Caseload Tracker  Orders    Filomena Stuart is a 89 y.o. female   PRIMARY DIAGNOSIS: Sepsis (HCC)  Atrial fibrillation with rapid ventricular response (HCC) [I48.91]  Acute cystitis without hematuria [N30.00]  Acute encephalopathy [G93.40]  Sepsis (HCC) [A41.9]       Inpatient: Payor: MEDICARE / Plan: MEDICARE PART A AND B / Product Type: *No Product type* /     ASSESSMENT:     REHAB RECOMMENDATIONS:   Recommendation to date pending progress:  Setting:  Short-term Rehab    Equipment:    To Be Determined     ASSESSMENT:  Ms. Stuart was supine in bed sleeping upon PT/OT arrival, patient is minimally verbal and unable to keep eyes open for session, mouth agape, and arms reaching out throughout session. Attempted to assist patient into sitting however found to be soiled, patient assisted in rolling maxAx2 x 3-4 during toileting hygiene and brief doffing/donning with  heavy resistance to any movement, thrashing arms and scratching. Will follow and progress as tolerated, patient demonstrates little to slow progress towards  Cadence Cooper  [address] [place] [address]  Phone: [phone]  Fax: (   )    -  Established Patient  Follow Up Time: 2 weeks